# Patient Record
Sex: FEMALE | Race: WHITE
[De-identification: names, ages, dates, MRNs, and addresses within clinical notes are randomized per-mention and may not be internally consistent; named-entity substitution may affect disease eponyms.]

---

## 2023-01-26 ENCOUNTER — HOSPITAL ENCOUNTER (INPATIENT)
Dept: HOSPITAL 12 - ER | Age: 48
LOS: 2 days | Discharge: HOME | DRG: 207 | End: 2023-01-28
Payer: MEDICAID

## 2023-01-26 VITALS — BODY MASS INDEX: 21.97 KG/M2 | WEIGHT: 140 LBS | HEIGHT: 67 IN

## 2023-01-26 DIAGNOSIS — E87.6: ICD-10-CM

## 2023-01-26 DIAGNOSIS — I31.9: Primary | ICD-10-CM

## 2023-01-26 DIAGNOSIS — E86.0: ICD-10-CM

## 2023-01-26 DIAGNOSIS — Z20.822: ICD-10-CM

## 2023-01-26 DIAGNOSIS — I51.7: ICD-10-CM

## 2023-01-26 DIAGNOSIS — E03.9: ICD-10-CM

## 2023-01-26 LAB
ALP SERPL-CCNC: 56 U/L (ref 50–136)
ALT SERPL W/O P-5'-P-CCNC: 33 U/L (ref 14–59)
AST SERPL-CCNC: 14 U/L (ref 15–37)
BILIRUB DIRECT SERPL-MCNC: < 0.1 MG/DL (ref 0–0.2)
BILIRUB SERPL-MCNC: 0.2 MG/DL (ref 0.2–1)
BUN SERPL-MCNC: 20 MG/DL (ref 7–18)
CHLORIDE SERPL-SCNC: 104 MMOL/L (ref 98–107)
CO2 SERPL-SCNC: 26 MMOL/L (ref 21–32)
CREAT SERPL-MCNC: 0.9 MG/DL (ref 0.6–1.3)
GLUCOSE SERPL-MCNC: 122 MG/DL (ref 74–106)
HCT VFR BLD AUTO: 32.8 % (ref 31.2–41.9)
MCH RBC QN AUTO: 29.1 UUG (ref 24.7–32.8)
MCV RBC AUTO: 88.5 FL (ref 75.5–95.3)
PLATELET # BLD AUTO: 281 K/UL (ref 179–408)
POTASSIUM SERPL-SCNC: 3.3 MMOL/L (ref 3.5–5.1)
WS STN SPEC: 7 G/DL (ref 6.4–8.2)

## 2023-01-26 PROCEDURE — G0378 HOSPITAL OBSERVATION PER HR: HCPCS

## 2023-01-26 PROCEDURE — A4663 DIALYSIS BLOOD PRESSURE CUFF: HCPCS

## 2023-01-26 NOTE — NUR
Family at bedside no voiced c/o, awaiting lab results and MD re-eval. No s/s of 
any distress at this time.

## 2023-01-26 NOTE — NUR
BIB paramedic from home with c/o chest pain x 2 days, Patient is alert & 
oriented x 4, assisted into gown and placed on monitor, Patient is awaiting MD 
exam, no s/s of any distress noted at this time, bedside EKG done for MD 
review, HL intact from field. side rails up will continue to monitor.

## 2023-01-27 VITALS — SYSTOLIC BLOOD PRESSURE: 116 MMHG | DIASTOLIC BLOOD PRESSURE: 68 MMHG

## 2023-01-27 VITALS — SYSTOLIC BLOOD PRESSURE: 113 MMHG | DIASTOLIC BLOOD PRESSURE: 74 MMHG

## 2023-01-27 LAB — RHEUMATOID FACT SER QL LA: NEGATIVE

## 2023-01-27 RX ADMIN — Medication SCH MG: at 14:49

## 2023-01-27 RX ADMIN — Medication SCH MG: at 20:16

## 2023-01-27 RX ADMIN — Medication SCH MG: at 12:30

## 2023-01-27 RX ADMIN — Medication SCH MG: at 17:30

## 2023-01-27 NOTE — NUR
MD was at bedside for ultrasound of the heart, informed patient that she will 
be admitted to the hospital, awaiting room assignment.

## 2023-01-27 NOTE — NUR
RECEIVED PT FROM ED VIA [facility] FROM [facility]. KS IS NOT COMPLAINING OF PAIN AND WHEN ASKED 
ABOUT HER PAIN, SHE SAID SHE FEELS SOME PAIN IN HER CHEST, LEVEL 5 ON THE SCALE OF 1-10. PT 
ASKED FOR SOMETHING TO EAT. PROVIDED HER WITH A SANDWICH. NO DISTRESS AT THE MOMENT. WILL 
CONTINUE TO MONITOR.

## 2023-01-27 NOTE — NUR
Received patient awake, no sob, complain of mild chest area pain but did not ask for pain 
meds, ambulate to her room, sinus rhythm sinus tachy 101, cont to monitor.

## 2023-01-27 NOTE — NUR
Patient resting, remains easy to arouse, no change in prior assessment, 
positioned self for comfort.

## 2023-01-28 VITALS — DIASTOLIC BLOOD PRESSURE: 61 MMHG | SYSTOLIC BLOOD PRESSURE: 103 MMHG

## 2023-01-28 VITALS — DIASTOLIC BLOOD PRESSURE: 70 MMHG | SYSTOLIC BLOOD PRESSURE: 114 MMHG

## 2023-01-28 LAB
ALP SERPL-CCNC: 48 U/L (ref 50–136)
ALT SERPL W/O P-5'-P-CCNC: 29 U/L (ref 14–59)
AST SERPL-CCNC: 19 U/L (ref 15–37)
BILIRUB SERPL-MCNC: 0.4 MG/DL (ref 0.2–1)
BUN SERPL-MCNC: 14 MG/DL (ref 7–18)
CHLORIDE SERPL-SCNC: 106 MMOL/L (ref 98–107)
CO2 SERPL-SCNC: 29 MMOL/L (ref 21–32)
CREAT SERPL-MCNC: 0.8 MG/DL (ref 0.6–1.3)
DSDNA AB SER-ACNC: 1 IU/ML (ref 0–9)
ENA SCL70 AB SER-ACNC: <0.2 AI (ref 0–0.9)
ENA SM AB SER-ACNC: <0.2 AI (ref 0–0.9)
ENA SS-A AB SER-ACNC: <0.2 AI (ref 0–0.9)
ENA SS-B AB SER-ACNC: <0.2 AI (ref 0–0.9)
GLUCOSE SERPL-MCNC: 107 MG/DL (ref 74–106)
HCT VFR BLD AUTO: 32 % (ref 31.2–41.9)
MAGNESIUM SERPL-MCNC: 1.9 MG/DL (ref 1.8–2.4)
MCH RBC QN AUTO: 29.7 UUG (ref 24.7–32.8)
MCV RBC AUTO: 88.7 FL (ref 75.5–95.3)
PHOSPHATE SERPL-MCNC: 3.4 MG/DL (ref 2.5–4.9)
PLATELET # BLD AUTO: 288 K/UL (ref 179–408)
POTASSIUM SERPL-SCNC: 5 MMOL/L (ref 3.5–5.1)
TSH SERPL DL<=0.005 MIU/L-ACNC: 3.51 MIU/ML (ref 0.36–3.74)
WS STN SPEC: 6.4 G/DL (ref 6.4–8.2)

## 2023-01-28 RX ADMIN — Medication SCH MG: at 12:00

## 2023-01-28 RX ADMIN — Medication SCH MG: at 08:07

## 2023-01-28 RX ADMIN — Medication SCH MG: at 08:08

## 2023-01-28 NOTE — NUR
Used  device with face time.   #[id number] [place]. Discussed with pt 
discharge instructions to f/u with pmd with 1 week.  Pt stated that she doesn't have a PMD.  
Instructed pt to discuss matter with her insurance to guide her which PMD is available for 
her.  Pt acknowledged that she will f/u with DR Street.  Per pt all records are to be 
released to dr street.  Record release and valuable signed by pt.  Discussed pt 
discharge summary written by [facility] - discussed that her left neck pain could 
be from arthritis.  PT verbalized understanding.   Educated pt to take her Meds with food.  
Prescription given to patient.  IV taken out.  Pt refused ibuprofen as pt will be picked up 
by her son at 1300.

## 2023-01-28 NOTE — NUR
Patient asleep but arousable no sob still complain of mild left chest area pain but did not 
ask for pain meds, patient given tylenol 650mg for mild pain with help, sinus rhythm on tele 
68, ambulatory continent cont to monitor.

## 2023-01-28 NOTE — NUR
Pt picked up by her son.  prescription given to patient.  Pt is in no acute distress upon 
d/c no sob noted.